# Patient Record
Sex: MALE | Race: BLACK OR AFRICAN AMERICAN | URBAN - METROPOLITAN AREA
[De-identification: names, ages, dates, MRNs, and addresses within clinical notes are randomized per-mention and may not be internally consistent; named-entity substitution may affect disease eponyms.]

---

## 2017-09-22 ENCOUNTER — OFFICE VISIT (OUTPATIENT)
Dept: FAMILY MEDICINE | Facility: CLINIC | Age: 37
End: 2017-09-22

## 2017-09-22 VITALS
WEIGHT: 189 LBS | HEIGHT: 69 IN | TEMPERATURE: 97.7 F | SYSTOLIC BLOOD PRESSURE: 115 MMHG | OXYGEN SATURATION: 96 % | RESPIRATION RATE: 18 BRPM | HEART RATE: 70 BPM | DIASTOLIC BLOOD PRESSURE: 78 MMHG | BODY MASS INDEX: 27.99 KG/M2

## 2017-09-22 DIAGNOSIS — L81.9 DISORDER OF PIGMENTATION OF LIP: ICD-10-CM

## 2017-09-22 DIAGNOSIS — Z00.00 PREVENTATIVE HEALTH CARE: ICD-10-CM

## 2017-09-22 DIAGNOSIS — R10.13 ABDOMINAL PAIN, EPIGASTRIC: Primary | ICD-10-CM

## 2017-09-22 DIAGNOSIS — Z13.9 SCREENING FOR CONDITION: ICD-10-CM

## 2017-09-22 DIAGNOSIS — K21.9 GASTROESOPHAGEAL REFLUX DISEASE, ESOPHAGITIS PRESENCE NOT SPECIFIED: ICD-10-CM

## 2017-09-22 DIAGNOSIS — E55.9 VITAMIN D DEFICIENCY: ICD-10-CM

## 2017-09-22 DIAGNOSIS — Z11.3 SCREEN FOR STD (SEXUALLY TRANSMITTED DISEASE): ICD-10-CM

## 2017-09-22 LAB
CHOLEST SERPL-MCNC: 225.9 MG/DL (ref 0–200)
CHOLEST/HDLC SERPL: 5.1 {RATIO} (ref 0–5)
HDLC SERPL-MCNC: 44.1 MG/DL
LDLC SERPL CALC-MCNC: 151 MG/DL (ref 0–129)
TRIGL SERPL-MCNC: 154.5 MG/DL (ref 0–150)
VLDL CHOLESTEROL: 30.9 MG/DL (ref 7–32)

## 2017-09-22 ASSESSMENT — ENCOUNTER SYMPTOMS
FEVER: 0
COLOR CHANGE: 1
CONSTIPATION: 0
DIARRHEA: 0
BLOOD IN STOOL: 0
CHEST TIGHTNESS: 0
VOMITING: 0
LIGHT-HEADEDNESS: 0
SHORTNESS OF BREATH: 0
ABDOMINAL PAIN: 1
CHILLS: 0
DIZZINESS: 0
NAUSEA: 1

## 2017-09-22 NOTE — MR AVS SNAPSHOT
After Visit Summary   9/22/2017    Duc Connell    MRN: [id number]           Patient Information     Date Of Birth          1980        Visit Information        Provider Department      9/22/2017 1:40 PM Sharif Segura DO [facility]        Today's Diagnoses     Abdominal pain, epigastric    -  1    Screen for STD (sexually transmitted disease)        Screening for condition        Disorder of pigmentation of lip          Care Instructions    Here is the plan from today's visit    1. Abdominal pain, epigastric  - H Pylori antigen stool; Future    2. Screen for STD (sexually transmitted disease)  - Neisseria gonorrhoeae PCR  - Chlamydia trachomatis PCR  - Anti Treponema  - HIV Antigen Antibody Combo  - Hepatitis B Surface Antibody  - Hepatitis B surface antigen  - Hepatitis C antibody    3. Screening for condition  - TSH with free T4 reflex  - Lipid Cascade ([facility])  - Vitamin D Level    4.  Pigment change on lip  - Dermatology referral      Please call or return to clinic if your symptoms don't go away.    Follow up plan: as needed      Thank you for coming to [facility] today.  Lab Testing:  **If you had lab testing today and your results are reassuring or normal they will be mailed to you or sent through [employer] within 7 days.   **If the lab tests need quick action we will call you with the results.  The phone number we will call with results is # [phone] (home) . If this is not the best number please call our clinic and change the number.  Medication Refills:  If you need any refills please call your pharmacy and they will contact us.   If you need to  your refill at a new pharmacy, please contact the new pharmacy directly. The new pharmacy will help you get your medications transferred faster.   Scheduling:  If you have any concerns about today's visit or wish to schedule another appointment please call our office during normal business hours  [phone] (8-5:00 M-F)  If a referral was made to a [facility] and you don't get a call from central scheduling please call [phone].  If a Mammogram was ordered for you at [facility] call [phone] to schedule or change your appointment.  If you had an XRay/CT/Ultrasound/MRI ordered the number is [phone] to schedule or change your radiology appointment.   Medical Concerns:  If you have urgent medical concerns please call [phone] at any time of the day.  If you have a medical emergency please call [phone].            Follow-ups after your visit        Additional Services     DERMATOLOGY REFERRAL - INTERNAL       Your provider has referred you to: PREFERRED PROVIDERS:  [facility]: Dermatology Clinic [facility] [phone]   [email or web address]    Please be aware that coverage of these services is subject to the terms and limitations of your health insurance plan.  Call member services at your health plan with any benefit or coverage questions.      Please bring the following with you to your appointment:    (1) Any X-Rays, CTs or MRIs which have been performed.  Contact the facility where they were done to arrange for  prior to your scheduled appointment.    (2) List of current medications  (3) This referral request   (4) Any documents/labs given to you for this referral                  Future tests that were ordered for you today     Open Future Orders        Priority Expected Expires Ordered    H Pylori antigen stool Routine  10/22/2017 9/22/2017            Who to contact     Please call your clinic at [phone] to:    Ask questions about your health    Make or cancel appointments    Discuss your medicines    Learn about your test results    Speak to your doctor   If you have compliments or concerns about an experience at your clinic, or if you wish to file a complaint, please contact [facility]  "Patient Relations at [phone] or email us at [email or web address]         Additional Information About Your Visit        [employer] Information     [employer] is an electronic gateway that provides easy, online access to your medical records. With [employer], you can request a clinic appointment, read your test results, renew a prescription or communicate with your care team.     To sign up for [employer] visit the website at [email or web address]   You will be asked to enter the access code listed below, as well as some personal information. Please follow the directions to create your username and password.     Your access code is: [email or web address]  Expires: 2017  3:57 PM     Your access code will  in 90 days. If you need help or a new code, please contact your [facility] or call [phone] for assistance.        Care EveryWhere ID     This is your Care EveryWhere ID. This could be used by other organizations to access your [place] medical records  [id number]        Your Vitals Were     Pulse Temperature Respirations Height Pulse Oximetry BMI (Body Mass Index)    70 97.7  F (36.5  C) (Oral) 18 5' 9\" (175.3 cm) 96% 27.91 kg/m2       Blood Pressure from Last 3 Encounters:   17 115/78    Weight from Last 3 Encounters:   17 189 lb (85.7 kg)              We Performed the Following     Anti Treponema     Chlamydia trachomatis PCR     DERMATOLOGY REFERRAL - INTERNAL     Hepatitis B Surface Antibody     Hepatitis B surface antigen     Hepatitis C antibody     HIV Antigen Antibody Combo     Lipid [place] ([place])     Neisseria gonorrhoeae PCR     TSH with free T4 reflex     Vitamin D Level        Primary Care Provider    Physician No Ref-Primary       No address on file        Equal Access to Services     HANY LOPEZ : Bora Wei, marry hayward, alberta mccann. So wa " [phone].    ATENCIÓN: Si habla viraj, tiene a whiting disposición servicios gratuitos de asistencia lingüística. Llneeru al [phone].    We comply with applicable federal civil rights laws and [place] laws. We do not discriminate on the basis of race, color, national origin, age, disability sex, sexual orientation or gender identity.            Thank you!     Thank you for choosing [facility]  for your care. Our goal is always to provide you with excellent care. Hearing back from our patients is one way we can continue to improve our services. Please take a few minutes to complete the written survey that you may receive in the mail after your visit with us. Thank you!             Your Updated Medication List - Protect others around you: Learn how to safely use, store and throw away your medicines at www.disposemymeds.org.      Notice  As of 9/22/2017  3:57 PM    You have not been prescribed any medications.

## 2017-09-22 NOTE — PATIENT INSTRUCTIONS
Here is the plan from today's visit    1. Abdominal pain, epigastric  - H Pylori antigen stool; Future    2. Screen for STD (sexually transmitted disease)  - Neisseria gonorrhoeae PCR  - Chlamydia trachomatis PCR  - Anti Treponema  - HIV Antigen Antibody Combo  - Hepatitis B Surface Antibody  - Hepatitis B surface antigen  - Hepatitis C antibody    3. Screening for condition  - TSH with free T4 reflex  - Lipid Cascade ([place])  - Vitamin D Level    4.  Pigment change on lip  - Dermatology referral      Please call or return to clinic if your symptoms don't go away.    Follow up plan: as needed      Thank you for coming to [facility] today.  Lab Testing:  **If you had lab testing today and your results are reassuring or normal they will be mailed to you or sent through [employer] within 7 days.   **If the lab tests need quick action we will call you with the results.  The phone number we will call with results is # [phone] (home) . If this is not the best number please call our clinic and change the number.  Medication Refills:  If you need any refills please call your pharmacy and they will contact us.   If you need to  your refill at a new pharmacy, please contact the new pharmacy directly. The new pharmacy will help you get your medications transferred faster.   Scheduling:  If you have any concerns about today's visit or wish to schedule another appointment please call our office during normal business hours [phone] (8-5:00 M-F)  If a referral was made to a [facility] and you don't get a call from central scheduling please call [phone].  If a Mammogram was ordered for you at [facility] call [phone] to schedule or change your appointment.  If you had an XRay/CT/Ultrasound/MRI ordered the number is [phone] to schedule or change your radiology appointment.   Medical Concerns:  If you have urgent medical concerns please call [phone] at any time  of the day.  If you have a medical emergency please call 911.      Dermatology referral  [phone]  1/25/17 @ 2:45p.m with

## 2017-09-22 NOTE — LETTER
September 25, 2017      Duc Connell  [address]  [place] [address]        Dear Duc,    Thank you for getting your care at [facility]. Please see below for your test results.    Your vitamin D level was low.  I sent a prescription for vitamin D to take daily.  Your cholesterol levels were high.  The best things to help your cholesterol are to work on your diet (eating more vegetables, lean meats, and cutting out simple sugars and fats) and exercise several times per week.  They are not at a point now where we need to prescribe medication.   Your hepatitis B surface antibody (the antibody that protects you against hepatitis B) was low meaning you will need a booster vaccine for hepatitis B or to repeat the series to make sure you are fully immunized.  Your other labs were all normal.    Resulted Orders   TSH with free T4 reflex   Result Value Ref Range    TSH 2.77 0.40 - 4.00 mU/L   Lipid Cascade ([facility])   Result Value Ref Range    Cholesterol 225.9 (H) 0.0 - 200.0 mg/dL    Cholesterol/HDL Ratio 5.1 (H) 0.0 - 5.0    HDL Cholesterol 44.1 >40.0 mg/dL    LDL Cholesterol Calculated 151 (H) 0 - 129 mg/dL    Triglycerides 154.5 (H) 0.0 - 150.0 mg/dL    VLDL Cholesterol 30.9 7.0 - 32.0 mg/dL   Vitamin D Level   Result Value Ref Range    Vitamin D Deficiency screening 21 20 - 75 ug/L      Comment:      Season, race, dietary intake, and treatment affect the concentration of   25-hydroxy-Vitamin D. Values may decrease during winter months and increase   during summer months. Values 20-29 ug/L may indicate Vitamin D insufficiency   and values <20 ug/L may indicate Vitamin D deficiency.  Vitamin D determination is routinely performed by an immunoassay specific for   25 hydroxyvitamin D3.  If an individual is on vitamin D2 (ergocalciferol)   supplementation, please specify 25 OH vitamin D2 and D3 level determination by   LCMSMS test VITD23.     Neisseria gonorrhoeae PCR   Result Value Ref Range     Specimen Descrip Urine     N Gonorrhea PCR Negative NEG^Negative      Comment:      Negative for N. gonorrhoeae rRNA by transcription mediated amplification.  A negative result by transcription mediated amplification does not preclude   the presence of N. gonorrhoeae infection because results are dependent on   proper and adequate collection, absence of inhibitors, and sufficient rRNA to   be detected.     Chlamydia trachomatis PCR   Result Value Ref Range    Specimen Description Urine     Chlamydia Trachomatis PCR Negative NEG^Negative      Comment:      Negative for C. trachomatis rRNA by transcription mediated amplification.  A negative result by transcription mediated amplification does not preclude   the presence of C. trachomatis infection because results are dependent on   proper and adequate collection, absence of inhibitors, and sufficient rRNA to   be detected.     Anti Treponema   Result Value Ref Range    Treponema pallidum Antibody Negative NEG^Negative   HIV Antigen Antibody Combo   Result Value Ref Range    HIV Antigen Antibody Combo Nonreactive NR^Nonreactive          Comment:      HIV-1 p24 Ag & HIV-1/HIV-2 Ab Not Detected   Hepatitis B Surface Antibody   Result Value Ref Range    Hepatitis B Surface Antibody 1.70 <8.00 m[IU]/mL      Comment:      Nonreactive, No antibody detected when the value is less than 8.00 m[IU]/mL.   Hepatitis B surface antigen   Result Value Ref Range    Hep B Surface Agn Nonreactive NR^Nonreactive   Hepatitis C antibody   Result Value Ref Range    Hepatitis C Antibody Nonreactive NR^Nonreactive      Comment:      Assay performance characteristics have not been established for newborns,   infants, and children       If you have any concerns about these results please call and leave a message for me or send a MyChart message to the clinic.    Sincerely,    Sharif Segura, DO

## 2017-09-22 NOTE — PROGRESS NOTES
HPI:       Duc Connell is a 37 year old who presents to our clinic for the first time today. He works as a  and lives in [place]. He presents today for the followin. Epigastric pain/burning  Patient describes a burning sensation in his chest and epigastrium that he has had for many years. It is worse when he eats, quita. oily foods or cheese. Sometimes wakes up with the burning feeling in the morning and needs to drink cold water or milk to make it go away. Feels nauseated when he eats sometimes, but denies any difficulty swallowing or any vomiting. Occasionally has some pain or pressure in the mid abdomen, though this comes and goes and is mild. Denies any melena or hematemesis. No NSAID use, and he is not taking any medications. Recalls that he had an upper endoscopy 8 or 9 years ago, because he was having abdominal pain and was vomiting up all of his food. At that point he was told he had a bacterial infection in his stomach. This was treated with antibiotics, and he was also given a liquid medication that he felt helped his symptoms at that time. He stopped taking the medication because his symptoms resolved with the treatment.    2. Lip discoloration  Has noticed this in the past 2-3 weeks. Says his lower lip has turned pink, with dark blotches. It used to be the same color as his upper lip, which is a darker brown. Denies any pain or irritation here.    3. Establishing care, would like vaccines and screening tests  Says he has not been to a doctor in 8 years. He would like some blood tests to check vitamin and lipid levels and for STIs and would like to catch up on any recommended immunizations that he has not had.    GERD/Heartburn  Onset: a long time, years    Description:   Burning in chest:Yes Details: burning when he eats, quita oily foods or cheese. Sometimes needs to drink cold water or milk in the morning to comfort chest      Intensity: mild, moderate in  "morning    Progression of Symptoms: same    Accompanying Signs & Symptoms:  Does it feel like food gets stuck?:no  Nausea: Yes Details: when eating    Vomiting (bloody?): no  Abdominal Pain: Yes Details: occasional right abdominal pressure, not really painful.    Black-Tarry stools: no  Bloody stools: no    History:   Previous ulcers: no    Precipitating factors:   Caffeine use: Yes      Alcohol use: no  NSAID/Aspirin use: no  Tobacco use: former, quit smoking about 8 years ago, was smoking half pack per day  Worse with fatty foods, beef    Alleviating factors:  Nothing  Therapies Tried and outcome:none      PAST MEDICAL HISTORY: History reviewed. No pertinent past medical history.    PAST SURGICAL HISTORY: History reviewed. No pertinent surgical history.    FAMILY HISTORY: History reviewed. No pertinent family history.    SOCIAL HISTORY:   Social History   Substance Use Topics     Smoking status: Former Smoker     Smokeless tobacco: Not on file     Alcohol use No      Problem, Medication and Allergy Lists were reviewed and are current.  Patient is a new patient to this clinic and so  I reviewed/updated the Past Medical History, the Family History and the Social History.          Review of Systems:   Review of Systems   Constitutional: Negative for chills and fever.   Respiratory: Negative for chest tightness and shortness of breath.    Cardiovascular: Negative for chest pain.   Gastrointestinal: Positive for abdominal pain and nausea. Negative for blood in stool, constipation, diarrhea and vomiting.   Skin: Positive for color change.        Color change on lower lip   Neurological: Negative for dizziness and light-headedness.             Physical Exam:   Patient Vitals for the past 24 hrs:   BP Temp Temp src Pulse Resp SpO2 Height Weight   09/22/17 1458 115/78 97.7  F (36.5  C) Oral 70 18 96 % 5' 9\" (175.3 cm) 189 lb (85.7 kg)     Body mass index is 27.91 kg/(m^2).  Vitals were reviewed and were normal     Physical " Exam   Constitutional: He is oriented to person, place, and time. He appears well-developed and well-nourished. No distress.   HENT:   Head: Normocephalic and atraumatic.   Mouth/Throat: Oropharynx is clear and moist.   Eyes: No scleral icterus.   Neck: Neck supple.   Cardiovascular: Normal rate and regular rhythm.    Pulmonary/Chest: Effort normal and breath sounds normal. No respiratory distress.   Abdominal: Soft. He exhibits no distension. There is no tenderness. There is no rebound and no guarding.   Lymphadenopathy:     He has no cervical adenopathy.   Neurological: He is alert and oriented to person, place, and time.   Skin: Skin is warm and dry.   Lower lip appears pink, with some dark blotches. Appears to lack pigmentation relative to the upper lip and surrounding skin, which is dark brown.       Results:   None    Assessment and Plan     1. Abdominal pain, epigastric  Duc's symptoms (burning in his chest after eating, especially greasy foods and in the AM) are consistent with gastroesophageal reflux. Discussed starting with lifestyle modifications today that may help with reflux: avoiding caffeine and spicy and greasy foods, eating small meals, not eating immediately before bed or before lying down.  Given his history of H pylori, will also do H pylori stool antigen testing to rule out recurrence of this.  If H pylori is negative and symptoms not improved with lifestyle changes, will consider prescribing H2 blocker or other similar medication.  - H Pylori antigen stool; Future    2. Disorder of pigmentation of lip  Loss of lip pigmentation without accompanying irritation or pain is possibly due to vitiligo. Refer to dermatology.  - DERMATOLOGY REFERRAL - INTERNAL    3. Preventative health care, STD screening, etc.  [facility] patient[facility] in [facility], offered the recommended vaccines that he is missing. Other screening per patient's request. Also recommended seeing a dentist, as Duc says he has not  done so for a long time. Gave him a printed list of dental clinics.  - Neisseria gonorrhoeae PCR  - Chlamydia trachomatis PCR  - Anti Treponema  - HIV Antigen Antibody Combo  - Hepatitis B Surface Antibody  - Hepatitis B surface antigen  - Hepatitis C antibody  - TSH with free T4 reflex  - Lipid Cascade ([place])  - Vitamin D Level  - ADMIN VACCINE, INITIAL  - ADMIN VACCINE, EACH ADDITIONAL  - TDAP VACCINE (BOOSTRIX)  - HEPATITIS B VACCINE,ADULT,IM  - CHICKEN POX VACCINE,LIVE,SUBCUT      There are no discontinued medications.  Options for treatment and follow-up care were reviewed with the patient. Duc Connell  engaged in the decision making process and verbalized understanding of the options discussed and agreed with the final plan.    This note is scribed by Rosemary Valdez MS3, on behalf of Dr. Segura.    Sharif Segura, DO

## 2017-09-23 LAB
T PALLIDUM IGG+IGM SER QL: NEGATIVE
TSH SERPL DL<=0.005 MIU/L-ACNC: 2.77 MU/L (ref 0.4–4)

## 2017-09-24 LAB
C TRACH DNA SPEC QL NAA+PROBE: NEGATIVE
N GONORRHOEA DNA SPEC QL NAA+PROBE: NEGATIVE
SPECIMEN SOURCE: NORMAL
SPECIMEN SOURCE: NORMAL

## 2017-09-25 LAB
DEPRECATED CALCIDIOL+CALCIFEROL SERPL-MC: 21 UG/L (ref 20–75)
HBV SURFACE AB SERPL IA-ACNC: 1.7 M[IU]/ML
HBV SURFACE AG SERPL QL IA: NONREACTIVE
HCV AB SERPL QL IA: NONREACTIVE
HIV 1+2 AB+HIV1 P24 AG SERPL QL IA: NONREACTIVE

## 2017-10-04 DIAGNOSIS — E55.9 VITAMIN D DEFICIENCY: ICD-10-CM

## 2017-10-04 NOTE — TELEPHONE ENCOUNTER
Request for medication refill:    Date of last visit at clinic: 9-22-17    Please complete refill if appropriate and CLOSE ENCOUNTER.    Closing the encounter signifies the refill is complete.    If refill has been denied, please complete the smart phrase .smirefuse and route it to the [facility] RN TRIAGE pool to inform the patient and the pharmacy.    Mary Rebolledo MA

## 2017-10-05 NOTE — PROGRESS NOTES
Preceptor Attestation:  I discussed the patient with the resident. Assessment and plan reviewed with resident and agreed upon.   Supervising Physician:  Cheri Aranda MD  [facility]

## 2022-02-17 PROBLEM — K21.9 GASTROESOPHAGEAL REFLUX DISEASE: Status: ACTIVE | Noted: 2017-09-25
